# Patient Record
Sex: MALE | Race: BLACK OR AFRICAN AMERICAN | NOT HISPANIC OR LATINO | Employment: STUDENT | URBAN - METROPOLITAN AREA
[De-identification: names, ages, dates, MRNs, and addresses within clinical notes are randomized per-mention and may not be internally consistent; named-entity substitution may affect disease eponyms.]

---

## 2022-06-24 ENCOUNTER — APPOINTMENT (OUTPATIENT)
Dept: GENERAL RADIOLOGY | Facility: CLINIC | Age: 22
End: 2022-06-24
Attending: EMERGENCY MEDICINE

## 2022-06-24 VITALS
TEMPERATURE: 98.3 F | OXYGEN SATURATION: 97 % | SYSTOLIC BLOOD PRESSURE: 118 MMHG | RESPIRATION RATE: 20 BRPM | DIASTOLIC BLOOD PRESSURE: 75 MMHG | HEART RATE: 78 BPM

## 2022-06-24 PROCEDURE — 29505 APPLICATION LONG LEG SPLINT: CPT | Mod: RT

## 2022-06-24 PROCEDURE — 73562 X-RAY EXAM OF KNEE 3: CPT | Mod: RT

## 2022-06-24 PROCEDURE — 99284 EMERGENCY DEPT VISIT MOD MDM: CPT | Mod: 25

## 2022-06-25 ENCOUNTER — HOSPITAL ENCOUNTER (EMERGENCY)
Facility: CLINIC | Age: 22
Discharge: HOME | End: 2022-06-25
Attending: EMERGENCY MEDICINE | Admitting: EMERGENCY MEDICINE

## 2022-06-25 DIAGNOSIS — M25.561 ACUTE PAIN OF RIGHT KNEE: ICD-10-CM

## 2022-06-25 PROCEDURE — 250N000013 HC RX MED GY IP 250 OP 250 PS 637: Performed by: EMERGENCY MEDICINE

## 2022-06-25 RX ORDER — IBUPROFEN 600 MG/1
600 TABLET, FILM COATED ORAL ONCE
Status: COMPLETED | OUTPATIENT
Start: 2022-06-25 | End: 2022-06-25

## 2022-06-25 RX ADMIN — IBUPROFEN 600 MG: 600 TABLET, FILM COATED ORAL at 02:35

## 2022-06-25 ASSESSMENT — ENCOUNTER SYMPTOMS
HEADACHES: 0
ARTHRALGIAS: 1

## 2022-06-25 NOTE — ED PROVIDER NOTES
History   Chief Complaint:  Leg Pain       HPI   Jessica Bello is a 22 year old male who presents with right knee pain after tripping down the stair. He missed one step and fell on 2 more steps hitting his right knee on the steps. He is able to walk on his right leg but it hurts to do so. He reports greatest pain in the knee. He denies hitting his head. No paresthesias.  He has not taken anything for pain.     Review of Systems   Musculoskeletal: Positive for arthralgias.   Neurological: Negative for headaches.   All other systems reviewed and are negative.    Allergies:  The patient has no known allergies.     Medications:  The patient is currently on no regular medications.    Past Medical History:     The patient denies past medical history.     Social History:  The patient presents to the ED alone  Denies ETOH use    Physical Exam     Patient Vitals for the past 24 hrs:   BP Temp Temp src Pulse Resp SpO2   06/24/22 2321 118/75 98.3  F (36.8  C) Temporal 78 20 97 %       Physical Exam  Nursing note and vitals reviewed.  Constitutional: Well nourished. Resting comfortably.   Head: atraumatic  Eyes: Conjunctiva normal.  Pupils are equal, round, and reactive to light.   ENT: Nose normal. Mucous membranes pink and moist.    Neck: Normal range of motion.  CVS: Normal rate, regular rhythm.   Pulmonary: Lungs clear to auscultation bilaterally.  GI: Abdomen soft.   MSK: Knee Exam:   Inspection: no overlying warmth/erythema/swelling  Palpation: TTP over the joint line  Strength:  Decreased active ROM flexion/extension knee 2/2 to pain  Patellar motions:  normal  Sensation: Intact to light touch distally  Cap refil:   < 2 seconds  DP/PT Pulses normal.  R hip: full flex/ext w/o pain, no ttp.  Rankle/foot: Able to flex/ext toes and DF/PF ankle actively. No TTP.  Special Tests:   Varus stress test: positive  Valgus stress test: positive  Neuro: Alert. Follows simple commands.  Skin: Skin is warm and dry. No rash noted.    Psychiatric: Normal affect.       Emergency Department Course     Imaging:  XR Knee Right 3 Views   Final Result   IMPRESSION: Normal joint spaces and alignment. No fracture or joint effusion.        Report per radiology    Emergency Department Course:    Reviewed:  I reviewed nursing notes, vitals, past medical history and Care Everywhere    Assessments:  1431 I obtained history and examined the patient as noted above.     1435 I rechecked the patient and explained findings.     Interventions:  0235 ibuprofen 600 mg PO    Disposition:  The patient was discharged to home.     Impression & Plan     Medical Decision Making:  Jessica Bello is a 22 year old male  who presents for evaluation of R. Knee pain s/p a fall. He is neurovascularly intact on arrival.  A broad differential was also considered including sprain, strain, fracture, tendon rupture, nerve impingement/compromise, referred pain.  Xray without focal fracture/dislocation.  Discussed with patient cannot exclude ligamentous injury at this time and recommend close outpatient ortho f/u as additional imaging may be warranted.  Patient placed in knee immobilizer with crutches.   Rest, ice, and elevation treatment was discussed with the patient. The patients head to toe trauma exam is otherwise negative for serious underlying disease of the head, neck, chest, abdomen, extremities, pelvis. NSAIDS/tylenol recommended.       Diagnosis:    ICD-10-CM    1. Acute pain of right knee  M25.561          Scribe Disclosure:  Brynn SHAH, am serving as a scribe at 2:30 AM on 6/25/2022 to document services personally performed by Erika Devi DO based on my observations and the provider's statements to me.          Erika Devi DO  06/25/22 0704

## 2025-04-28 PROCEDURE — 99282 EMERGENCY DEPT VISIT SF MDM: CPT

## 2025-04-28 RX ORDER — DIPHENHYDRAMINE HCL 25 MG
50 CAPSULE ORAL ONCE
Status: COMPLETED | OUTPATIENT
Start: 2025-04-29 | End: 2025-04-29

## 2025-04-28 RX ORDER — DIPHENHYDRAMINE HYDROCHLORIDE 50 MG/ML
50 INJECTION, SOLUTION INTRAMUSCULAR; INTRAVENOUS ONCE
Status: COMPLETED | OUTPATIENT
Start: 2025-04-29 | End: 2025-04-29

## 2025-04-28 ASSESSMENT — COLUMBIA-SUICIDE SEVERITY RATING SCALE - C-SSRS
2. HAVE YOU ACTUALLY HAD ANY THOUGHTS OF KILLING YOURSELF IN THE PAST MONTH?: NO
6. HAVE YOU EVER DONE ANYTHING, STARTED TO DO ANYTHING, OR PREPARED TO DO ANYTHING TO END YOUR LIFE?: NO
1. IN THE PAST MONTH, HAVE YOU WISHED YOU WERE DEAD OR WISHED YOU COULD GO TO SLEEP AND NOT WAKE UP?: NO

## 2025-04-29 ENCOUNTER — HOSPITAL ENCOUNTER (EMERGENCY)
Facility: CLINIC | Age: 25
Discharge: HOME | End: 2025-04-29
Attending: EMERGENCY MEDICINE | Admitting: EMERGENCY MEDICINE
Payer: COMMERCIAL

## 2025-04-29 VITALS
RESPIRATION RATE: 18 BRPM | OXYGEN SATURATION: 96 % | DIASTOLIC BLOOD PRESSURE: 53 MMHG | TEMPERATURE: 97.5 F | HEART RATE: 59 BPM | SYSTOLIC BLOOD PRESSURE: 105 MMHG

## 2025-04-29 DIAGNOSIS — H10.9 CONJUNCTIVITIS OF BOTH EYES, UNSPECIFIED CONJUNCTIVITIS TYPE: ICD-10-CM

## 2025-04-29 PROCEDURE — 250N000013 HC RX MED GY IP 250 OP 250 PS 637: Performed by: EMERGENCY MEDICINE

## 2025-04-29 RX ORDER — MOXIFLOXACIN 5 MG/ML
1 SOLUTION/ DROPS OPHTHALMIC 3 TIMES DAILY
Qty: 3 ML | Refills: 0 | Status: SHIPPED | OUTPATIENT
Start: 2025-04-29 | End: 2025-05-06

## 2025-04-29 RX ORDER — TETRACAINE HYDROCHLORIDE 5 MG/ML
2 SOLUTION OPHTHALMIC ONCE
Status: DISCONTINUED | OUTPATIENT
Start: 2025-04-29 | End: 2025-04-29 | Stop reason: HOSPADM

## 2025-04-29 RX ADMIN — DIPHENHYDRAMINE HYDROCHLORIDE 50 MG: 25 CAPSULE ORAL at 00:02

## 2025-04-29 ASSESSMENT — ACTIVITIES OF DAILY LIVING (ADL): ADLS_ACUITY_SCORE: 41

## 2025-04-29 NOTE — ED TRIAGE NOTES
Pt presents to triage with c/o CLIVE eye itching since last week. Symptoms getting worse, has not taken any medication for this issue.

## 2025-04-29 NOTE — ED PROVIDER NOTES
Emergency Department Note      History of Present Illness     Chief Complaint   Eye Problem      HPI   Jessica Bello is a 25 year old male who presents to the ED with bilateral eye pain and irritation for 1 week.  He says he does not typically have seasonal allergies this time a year.  No trauma or any foreign bodies blowing into the eye.  He does not use glasses or contacts.  He denies vision loss.  He has itching and watering but otherwise does not have pain in the eyes.  No prior similar episodes.  He did not use any eyedrops but did take some Benadryl and felt that his symptoms improved somewhat.  No fever, cough, congestion.  No rash elsewhere the body.  No abdominal pain.  No headache.  No focal neurologic deficits.    Past Medical History     Medical History and Problem List   No past medical history on file.    Medications   moxifloxacin (VIGAMOX) 0.5 % ophthalmic solution        Surgical History   No past surgical history on file.    Physical Exam     Patient Vitals for the past 24 hrs:   BP Temp Pulse Resp SpO2   04/29/25 0124 105/53 -- 59 -- 96 %   04/28/25 2352 134/71 97.5  F (36.4  C) 62 18 95 %     Physical Exam  Constitutional:       General: He is not in acute distress.     Appearance: Normal appearance. He is not toxic-appearing.   HENT:      Head: Atraumatic.      Right Ear: Tympanic membrane, ear canal and external ear normal.      Left Ear: Tympanic membrane, ear canal and external ear normal.      Mouth/Throat:      Mouth: Mucous membranes are moist.      Pharynx: Oropharynx is clear.   Eyes:      General: No scleral icterus.     Pupils: Pupils are equal, round, and reactive to light.      Comments: Bilateral conjunctival injection.  No foreign body on lid eversion.  Visual fields grossly intact.  Extraocular wounds intact.  Lids normal.  No orbital erythema or chemosis.  No hyphema or hypopyon.  Intraocular pressure on the right is 19 on the left is 16.  No fluorescein uptake to indicate an  abrasion.  Riya sign is negative.   Cardiovascular:      Rate and Rhythm: Normal rate and regular rhythm.      Heart sounds: Normal heart sounds.   Pulmonary:      Effort: Pulmonary effort is normal. No respiratory distress.      Breath sounds: Normal breath sounds.   Abdominal:      Palpations: Abdomen is soft.      Tenderness: There is no abdominal tenderness.   Musculoskeletal:         General: No deformity.      Cervical back: Neck supple.   Skin:     General: Skin is warm.      Capillary Refill: Capillary refill takes less than 2 seconds.   Neurological:      General: No focal deficit present.      Mental Status: He is alert and oriented to person, place, and time.   Psychiatric:         Mood and Affect: Mood normal.         Behavior: Behavior normal.           ED Course      Medications Administered   Medications   diphenhydrAMINE (BENADRYL) capsule 50 mg (50 mg Oral $Given 4/29/25 0002)     Or   diphenhydrAMINE (BENADRYL) injection 50 mg ( Intramuscular See Alternative 4/29/25 0002)     Or   diphenhydrAMINE (BENADRYL) injection 50 mg ( Intravenous See Alternative 4/29/25 0002)       Medical Decision Making / Diagnosis     ML Bello is a 25 year old male who presents to the ED for bilateral irritation and drainage from the eyes.  He does not typically get seasonal allergies.  No vision loss.  Intraocular pressures are normal no evidence of glaucoma.  No evidence of corneal abrasion or globe rupture.  This is most likely conjunctivitis.  He will be treated with Vigamox and given primary care follow-up.  We discussed return precautions.    Disposition   The patient was discharged.     Diagnosis     ICD-10-CM    1. Conjunctivitis of both eyes, unspecified conjunctivitis type  H10.9 Primary Care Referral           Discharge Medications   Discharge Medication List as of 4/29/2025  1:25 AM        START taking these medications    Details   moxifloxacin (VIGAMOX) 0.5 % ophthalmic solution Place 1 drop  into both eyes 3 times daily for 7 days., Disp-3 mL, R-0, E-Prescribe                     Twin Ordaz MD  04/29/25 0500